# Patient Record
Sex: MALE | Race: WHITE
[De-identification: names, ages, dates, MRNs, and addresses within clinical notes are randomized per-mention and may not be internally consistent; named-entity substitution may affect disease eponyms.]

---

## 2022-06-20 ENCOUNTER — NURSE TRIAGE (OUTPATIENT)
Dept: OTHER | Facility: CLINIC | Age: 57
End: 2022-06-20

## 2022-06-20 NOTE — TELEPHONE ENCOUNTER
Subjective: Caller states \"Yesterday I woke up and was sick at my stomach and had diarrhea. This morning I have inflamed rash on front of my left shin, swollen, painful to touch, low grade fever \"     Current Symptoms: Abdominal cramping/diarrhea x2 and vomiting x5, shakes. Banerjee swelling, red, hot to touch, fever 100.6F    Onset: 1 day ago; gradual    Associated Symptoms: reduced activity       While on the line with patient - he got an appt with his PCP for 1130 today via [facility]. No longer needs triage. Encouraged patient to call back if he has any questions/concerns. Care advice provided, patient verbalizes understanding; denies any other questions or concerns; instructed to call back for any new or worsening symptoms. This triage is a result of a call to [facility]. Please do not respond to the triage nurse through this encounter. Any subsequent communication should be directly with the patient.     Reason for Disposition   General information question, no triage required and triager able to answer question    Protocols used: INFORMATION ONLY CALL - NO TRIAGE-[facility]-

## 2023-04-25 ENCOUNTER — NURSE TRIAGE (OUTPATIENT)
Dept: OTHER | Facility: CLINIC | Age: 58
End: 2023-04-25

## 2023-04-25 NOTE — TELEPHONE ENCOUNTER
Location of patient: [place]    Subjective: Caller states \"Want to get a prescription for an antibiotic\"     Current Symptoms: Sore throat, cough, congestion    Declines triage    Discussed options for TeleDoc, Virtual Visits      Reason for Disposition   Information only question and nurse able to answer    Protocols used:  Information Only Call - No Triage-[facility]-[place]